# Patient Record
Sex: FEMALE | Race: WHITE | NOT HISPANIC OR LATINO | Employment: OTHER | URBAN - METROPOLITAN AREA
[De-identification: names, ages, dates, MRNs, and addresses within clinical notes are randomized per-mention and may not be internally consistent; named-entity substitution may affect disease eponyms.]

---
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
## 2018-07-20 ASSESSMENT — VISUAL ACUITY
OD_CC: J1
OS_SC: J2
OD_SC: 20/20-1
OD_CC: 20/20
OS_SC: 20/25
OS_CC: J1
OD_SC: J1
OS_CC: 20/25

## 2018-07-24 ENCOUNTER — CONTACT LENS EXAM ESTABLISHED (OUTPATIENT)
Dept: URBAN - METROPOLITAN AREA CLINIC 35 | Facility: CLINIC | Age: 18
End: 2018-07-24

## 2018-07-24 DIAGNOSIS — H52.13: ICD-10-CM

## 2018-07-24 PROCEDURE — 92015 DETERMINE REFRACTIVE STATE: CPT

## 2018-07-24 PROCEDURE — 92310-1 LEVEL 1 CONTACT LENS MANAGEMENT

## 2018-07-24 PROCEDURE — 92014 COMPRE OPH EXAM EST PT 1/>: CPT

## 2018-07-24 ASSESSMENT — KERATOMETRY
OD_K1POWER_DIOPTERS: 44.25
OD_K2POWER_DIOPTERS: 43.50
OS_K1POWER_DIOPTERS: 45.00
OD_AXISANGLE2_DEGREES: 99
OS_K2POWER_DIOPTERS: 44.00
OS_AXISANGLE2_DEGREES: 73

## 2018-07-24 ASSESSMENT — VISUAL ACUITY
OD_CC: J1
OS_CC: J1+
OD_CC: 20/25
OS_CC: 20/25

## 2018-07-24 ASSESSMENT — TONOMETRY
OS_IOP_MMHG: 14
OD_IOP_MMHG: 14

## 2019-08-07 ENCOUNTER — CONTACT LENS EXAM ESTABLISHED (OUTPATIENT)
Dept: URBAN - METROPOLITAN AREA CLINIC 35 | Facility: CLINIC | Age: 19
End: 2019-08-07

## 2019-08-07 DIAGNOSIS — H52.13: ICD-10-CM

## 2019-08-07 PROCEDURE — 92310-2 LEVEL 2 CONTACT LENS MANAGEMENT

## 2019-08-07 PROCEDURE — 92014 COMPRE OPH EXAM EST PT 1/>: CPT

## 2019-08-07 PROCEDURE — 92015 DETERMINE REFRACTIVE STATE: CPT

## 2019-08-07 ASSESSMENT — KERATOMETRY
OD_K2POWER_DIOPTERS: 43.5
OD_K1POWER_DIOPTERS: 44.5
OS_AXISANGLE_DEGREES: 160
OD_AXISANGLE_DEGREES: 11
OS_K2POWER_DIOPTERS: 43.75
OS_K1POWER_DIOPTERS: 45
OS_AXISANGLE2_DEGREES: 70
OD_AXISANGLE2_DEGREES: 101

## 2019-08-07 ASSESSMENT — VISUAL ACUITY
OD_CC: J1 CLS
OS_CC: J1 CLS

## 2019-08-07 ASSESSMENT — TONOMETRY
OD_IOP_MMHG: 14
OS_IOP_MMHG: 14

## 2019-08-27 NOTE — PATIENT DISCUSSION
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  RETURN FOR FOLLOW-UP AS SCHEDULED.

## 2019-08-27 NOTE — PATIENT DISCUSSION
MODERATE DRY EYES: PRESCRIBED DISAPPEARING PRESERVATIVE OR PRESERVATIVE FREE ARTIFICIAL TEARS 4-6X A DAY, OU AND THE DAILY INTAKE OF OMEGA-3 DHA/EPA FATTY ACIDS TO HELP RELIEVE SYMPTOMS. ADD NIGHTLY LUBRICATING OINTMENT OR GEL. RETURN FOR FOLLOW-UP AS SCHEDULED OR SOONER IF SYMPTOMS WORSEN.

## 2020-10-02 NOTE — PATIENT DISCUSSION
PHOTOGRAPHS: I have reviewed the external ocular photographs of this patient which show the following: moderate dermatochalasis of both upper and lower eyelids.

## 2021-11-19 ASSESSMENT — KERATOMETRY
OS_K1POWER_DIOPTERS: 45
OS_K2POWER_DIOPTERS: 43.75
OD_K2POWER_DIOPTERS: 43.5
OS_AXISANGLE_DEGREES: 160
OS_AXISANGLE2_DEGREES: 70
OD_K1POWER_DIOPTERS: 44.5
OD_AXISANGLE2_DEGREES: 101
OD_AXISANGLE_DEGREES: 11

## 2021-11-20 ENCOUNTER — ESTABLISHED COMPREHENSIVE EXAM (OUTPATIENT)
Dept: URBAN - METROPOLITAN AREA CLINIC 35 | Facility: CLINIC | Age: 21
End: 2021-11-20

## 2021-11-20 DIAGNOSIS — H52.13: ICD-10-CM

## 2021-11-20 PROCEDURE — 92310-1 LEVEL 1 CONTACT LENS MANAGEMENT

## 2021-11-20 PROCEDURE — 92015 DETERMINE REFRACTIVE STATE: CPT

## 2021-11-20 PROCEDURE — 92014 COMPRE OPH EXAM EST PT 1/>: CPT

## 2021-11-20 ASSESSMENT — TONOMETRY
OS_IOP_MMHG: 15
OD_IOP_MMHG: 15

## 2021-11-20 ASSESSMENT — VISUAL ACUITY
OU_CC: 20/25
OD_CC: J1
OS_CC: J1
OD_CC: 20/25
OS_CC: 20/30-1
OU_CC: J1

## 2021-11-30 NOTE — PATIENT DISCUSSION
Baseline disc photos and pachy done today.  VF at follow up.  + FH of glaucoma. thick pachy, follow.